# Patient Record
Sex: MALE | NOT HISPANIC OR LATINO | Employment: FULL TIME | URBAN - METROPOLITAN AREA
[De-identification: names, ages, dates, MRNs, and addresses within clinical notes are randomized per-mention and may not be internally consistent; named-entity substitution may affect disease eponyms.]

---

## 2024-02-06 ENCOUNTER — APPOINTMENT (OUTPATIENT)
Dept: RADIOLOGY | Facility: HOSPITAL | Age: 29
End: 2024-02-06
Payer: COMMERCIAL

## 2024-02-06 ENCOUNTER — APPOINTMENT (OUTPATIENT)
Dept: CARDIOLOGY | Facility: HOSPITAL | Age: 29
End: 2024-02-06
Payer: COMMERCIAL

## 2024-02-06 ENCOUNTER — HOSPITAL ENCOUNTER (EMERGENCY)
Facility: HOSPITAL | Age: 29
Discharge: HOME | End: 2024-02-06
Attending: STUDENT IN AN ORGANIZED HEALTH CARE EDUCATION/TRAINING PROGRAM
Payer: COMMERCIAL

## 2024-02-06 VITALS
TEMPERATURE: 97.7 F | RESPIRATION RATE: 10 BRPM | HEIGHT: 69 IN | SYSTOLIC BLOOD PRESSURE: 122 MMHG | WEIGHT: 136.69 LBS | HEART RATE: 52 BPM | BODY MASS INDEX: 20.24 KG/M2 | DIASTOLIC BLOOD PRESSURE: 62 MMHG | OXYGEN SATURATION: 97 %

## 2024-02-06 DIAGNOSIS — K27.9 PEPTIC ULCER DISEASE: Primary | ICD-10-CM

## 2024-02-06 LAB
ALBUMIN SERPL BCP-MCNC: 4.4 G/DL (ref 3.4–5)
ALP SERPL-CCNC: 55 U/L (ref 33–120)
ALT SERPL W P-5'-P-CCNC: 16 U/L (ref 10–52)
ANION GAP SERPL CALC-SCNC: 11 MMOL/L (ref 10–20)
AST SERPL W P-5'-P-CCNC: 11 U/L (ref 9–39)
BASOPHILS # BLD AUTO: 0.06 X10*3/UL (ref 0–0.1)
BASOPHILS NFR BLD AUTO: 1 %
BILIRUB SERPL-MCNC: 1.1 MG/DL (ref 0–1.2)
BUN SERPL-MCNC: 13 MG/DL (ref 6–23)
CALCIUM SERPL-MCNC: 8.9 MG/DL (ref 8.6–10.3)
CARDIAC TROPONIN I PNL SERPL HS: 3 NG/L (ref 0–20)
CARDIAC TROPONIN I PNL SERPL HS: <3 NG/L (ref 0–20)
CHLORIDE SERPL-SCNC: 103 MMOL/L (ref 98–107)
CO2 SERPL-SCNC: 27 MMOL/L (ref 21–32)
CREAT SERPL-MCNC: 0.87 MG/DL (ref 0.5–1.3)
D DIMER PPP FEU-MCNC: <215 NG/ML FEU
EGFRCR SERPLBLD CKD-EPI 2021: >90 ML/MIN/1.73M*2
EOSINOPHIL # BLD AUTO: 0.17 X10*3/UL (ref 0–0.7)
EOSINOPHIL NFR BLD AUTO: 2.9 %
ERYTHROCYTE [DISTWIDTH] IN BLOOD BY AUTOMATED COUNT: 11.5 % (ref 11.5–14.5)
GLUCOSE SERPL-MCNC: 100 MG/DL (ref 74–99)
HCT VFR BLD AUTO: 43.5 % (ref 41–52)
HGB BLD-MCNC: 15 G/DL (ref 13.5–17.5)
IMM GRANULOCYTES # BLD AUTO: 0.03 X10*3/UL (ref 0–0.7)
IMM GRANULOCYTES NFR BLD AUTO: 0.5 % (ref 0–0.9)
LIPASE SERPL-CCNC: 22 U/L (ref 9–82)
LYMPHOCYTES # BLD AUTO: 1.47 X10*3/UL (ref 1.2–4.8)
LYMPHOCYTES NFR BLD AUTO: 25.4 %
MAGNESIUM SERPL-MCNC: 1.88 MG/DL (ref 1.6–2.4)
MCH RBC QN AUTO: 30.8 PG (ref 26–34)
MCHC RBC AUTO-ENTMCNC: 34.5 G/DL (ref 32–36)
MCV RBC AUTO: 89 FL (ref 80–100)
MONOCYTES # BLD AUTO: 0.39 X10*3/UL (ref 0.1–1)
MONOCYTES NFR BLD AUTO: 6.7 %
NEUTROPHILS # BLD AUTO: 3.66 X10*3/UL (ref 1.2–7.7)
NEUTROPHILS NFR BLD AUTO: 63.5 %
NRBC BLD-RTO: 0 /100 WBCS (ref 0–0)
PLATELET # BLD AUTO: 249 X10*3/UL (ref 150–450)
POTASSIUM SERPL-SCNC: 4.1 MMOL/L (ref 3.5–5.3)
PROT SERPL-MCNC: 6.8 G/DL (ref 6.4–8.2)
RBC # BLD AUTO: 4.87 X10*6/UL (ref 4.5–5.9)
SODIUM SERPL-SCNC: 137 MMOL/L (ref 136–145)
WBC # BLD AUTO: 5.8 X10*3/UL (ref 4.4–11.3)

## 2024-02-06 PROCEDURE — 2500000001 HC RX 250 WO HCPCS SELF ADMINISTERED DRUGS (ALT 637 FOR MEDICARE OP): Performed by: PHYSICIAN ASSISTANT

## 2024-02-06 PROCEDURE — 36415 COLL VENOUS BLD VENIPUNCTURE: CPT | Performed by: PHYSICIAN ASSISTANT

## 2024-02-06 PROCEDURE — 93005 ELECTROCARDIOGRAM TRACING: CPT

## 2024-02-06 PROCEDURE — 71046 X-RAY EXAM CHEST 2 VIEWS: CPT | Performed by: RADIOLOGY

## 2024-02-06 PROCEDURE — 83690 ASSAY OF LIPASE: CPT | Performed by: PHYSICIAN ASSISTANT

## 2024-02-06 PROCEDURE — 85379 FIBRIN DEGRADATION QUANT: CPT | Performed by: PHYSICIAN ASSISTANT

## 2024-02-06 PROCEDURE — 83735 ASSAY OF MAGNESIUM: CPT | Performed by: PHYSICIAN ASSISTANT

## 2024-02-06 PROCEDURE — 71046 X-RAY EXAM CHEST 2 VIEWS: CPT

## 2024-02-06 PROCEDURE — 84484 ASSAY OF TROPONIN QUANT: CPT | Performed by: PHYSICIAN ASSISTANT

## 2024-02-06 PROCEDURE — 85025 COMPLETE CBC W/AUTO DIFF WBC: CPT | Performed by: PHYSICIAN ASSISTANT

## 2024-02-06 PROCEDURE — 80053 COMPREHEN METABOLIC PANEL: CPT | Performed by: PHYSICIAN ASSISTANT

## 2024-02-06 PROCEDURE — 99284 EMERGENCY DEPT VISIT MOD MDM: CPT | Mod: 25 | Performed by: STUDENT IN AN ORGANIZED HEALTH CARE EDUCATION/TRAINING PROGRAM

## 2024-02-06 RX ORDER — LIDOCAINE HYDROCHLORIDE 20 MG/ML
1.25 SOLUTION OROPHARYNGEAL ONCE
Status: COMPLETED | OUTPATIENT
Start: 2024-02-06 | End: 2024-02-06

## 2024-02-06 RX ORDER — OMEPRAZOLE 20 MG/1
40 CAPSULE, DELAYED RELEASE ORAL DAILY
Qty: 60 CAPSULE | Refills: 0 | Status: SHIPPED | OUTPATIENT
Start: 2024-02-06 | End: 2024-05-31 | Stop reason: WASHOUT

## 2024-02-06 RX ORDER — ALUMINUM HYDROXIDE, MAGNESIUM HYDROXIDE, AND SIMETHICONE 1200; 120; 1200 MG/30ML; MG/30ML; MG/30ML
30 SUSPENSION ORAL ONCE
Status: COMPLETED | OUTPATIENT
Start: 2024-02-06 | End: 2024-02-06

## 2024-02-06 RX ADMIN — LIDOCAINE HYDROCHLORIDE 15 ML: 20 SOLUTION ORAL at 10:25

## 2024-02-06 RX ADMIN — ALUMINUM HYDROXIDE, MAGNESIUM HYDROXIDE, AND DIMETHICONE 30 ML: 200; 20; 200 SUSPENSION ORAL at 10:26

## 2024-02-06 ASSESSMENT — PAIN DESCRIPTION - FREQUENCY: FREQUENCY: CONSTANT/CONTINUOUS

## 2024-02-06 ASSESSMENT — COLUMBIA-SUICIDE SEVERITY RATING SCALE - C-SSRS
6. HAVE YOU EVER DONE ANYTHING, STARTED TO DO ANYTHING, OR PREPARED TO DO ANYTHING TO END YOUR LIFE?: NO
2. HAVE YOU ACTUALLY HAD ANY THOUGHTS OF KILLING YOURSELF?: NO
1. IN THE PAST MONTH, HAVE YOU WISHED YOU WERE DEAD OR WISHED YOU COULD GO TO SLEEP AND NOT WAKE UP?: NO

## 2024-02-06 ASSESSMENT — PAIN - FUNCTIONAL ASSESSMENT: PAIN_FUNCTIONAL_ASSESSMENT: 0-10

## 2024-02-06 ASSESSMENT — PAIN DESCRIPTION - DESCRIPTORS: DESCRIPTORS: ACHING

## 2024-02-06 ASSESSMENT — LIFESTYLE VARIABLES
EVER HAD A DRINK FIRST THING IN THE MORNING TO STEADY YOUR NERVES TO GET RID OF A HANGOVER: NO
HAVE YOU EVER FELT YOU SHOULD CUT DOWN ON YOUR DRINKING: NO
EVER FELT BAD OR GUILTY ABOUT YOUR DRINKING: NO
HAVE PEOPLE ANNOYED YOU BY CRITICIZING YOUR DRINKING: NO

## 2024-02-06 ASSESSMENT — PAIN SCALES - GENERAL
PAINLEVEL_OUTOF10: 2
PAINLEVEL_OUTOF10: 4

## 2024-02-06 ASSESSMENT — PAIN DESCRIPTION - PROGRESSION: CLINICAL_PROGRESSION: NOT CHANGED

## 2024-02-06 ASSESSMENT — PAIN DESCRIPTION - LOCATION: LOCATION: CHEST

## 2024-02-06 ASSESSMENT — PAIN DESCRIPTION - DIRECTION: RADIATING_TOWARDS: ABD

## 2024-02-06 ASSESSMENT — PAIN DESCRIPTION - ONSET: ONSET: AWAKENED FROM SLEEP

## 2024-02-06 NOTE — ED TRIAGE NOTES
Pt c/o epigastric pain that radiates to his abdomin for the past week off and on. The pain increases with fatty foods.

## 2024-02-06 NOTE — ED PROVIDER NOTES
HPI   Chief Complaint   Patient presents with    Chest Pain     Pt c/o epigastric pain that radiates to his abdomin for the past week off and on. The pain increases with fatty foods.       History of present illness:  28-year-old male presents to the emergency room for complaints of epigastric abdominal pain that began 8 days ago.  He states the pain is in his upper abdomen and states that moves to the right upper side slightly.  He states he has not had any nausea or vomiting and states that she gets better when he eats.  He states he been taking Prilosec as well for the past 5 days which has been helping some.  He denies any change in bowel habits or any blood in his stool.  He states he has no previous gastric pathology.  He states that he does have a history of a bicuspid valve of the aortic valve but he states he has not had an echo done in over 10 years.  He states he has no other symptoms at this time he states he does drink alcohol on daily basis usually a couple beers at least a day.  He denies taking any NSAIDs or aspirin denies any other use of over-the-counter medications in general.    Social history: Negative for drug use, confirms regular alcohol use    Review of systems:   Gen.: No weight loss,  fever.   Eyes: No vision loss, double vision  ENT: No pharyngitis, neck pain  Cardiac: No chest pain, palpitations, syncope  Pulmonary: No shortness of breath, cough, hemoptysis.   Heme/lymph: No swollen glands, fever, bleeding.   GI: No change in bowel habits, melena, hematemesis, hematochezia, vomiting, diarrhea.   : No discharge, dysuria, frequency, urgency, hematuria.   Musculoskeletal: No limb pain, joint pain, joint swelling.   Skin: No rashes.   Review of systems is otherwise negative unless stated above or in history of present illness.          Physical exam:  General: Vitals noted, no distress. Afebrile.   EENT: No lymphadenopathy appreciated  Cardiac: Regular, rate, rhythm, no murmur.    Pulmonary: Lungs clear bilaterally with good aeration. No adventitious breath sounds.   Abdomen: Soft, nonsurgical. Nontender. No peritoneal signs. Normoactive bowel sounds.   Extremities: No peripheral edema.   Skin: No rash.   Neuro: No focal neurologic deficits        Medical decision making:   Testing: CBC CMP troponin EKG chest x-ray lipase: No acute findings troponin x 2 was negative EKG showed left ventricular hypertrophy but otherwise unremarkable  Treatment: Maalox p.o. and viscous lidocaine p.o. given  Reevaluation: Patient reports improvement in symptoms  Plan: Home-going.  Discussed differential. Will follow-up with GI and Cardiology in the next 2-3 days. Return if worse. They understand return precautions and discharge instructions. Patient and family/friend/caregiver are in agreement with this plan. 28-year-old male presents to the emergency room for complaints of epigastric abdominal pain that began 8 days ago.  He states the pain is in his upper abdomen and states that moves to the right upper side slightly.  He states he has not had any nausea or vomiting and states that she gets better when he eats.  He states he been taking Prilosec as well for the past 5 days which has been helping some.  He denies any change in bowel habits or any blood in his stool.  He states he has no previous gastric pathology.  He states that he does have a history of a bicuspid valve of the aortic valve but he states he has not had an echo done in over 10 years.  He states he has no other symptoms at this time he states he does drink alcohol on daily basis usually a couple beers at least a day.  He denies taking any NSAIDs or aspirin denies any other use of over-the-counter medications in general.  On physical exam there is no pain to palpation across the abdomen normal active bowel sounds throughout lungs are clear to auscultation bilaterally normal S1-S2 heart sounds.  I explained to the patient that I believe that he  would benefit from a short course of Prilosec 40 mg a day.  I explained to him that like him to follow-up closely with cardiology as well as gastroenterology given his symptoms and he was agreeable to this plan.  Impression:   1.  Peptic ulcer disease            History provided by:  Patient   used: No                        Isaiah Coma Scale Score: 15                  Patient History   No past medical history on file.  Past Surgical History:   Procedure Laterality Date    OTHER SURGICAL HISTORY  03/30/2017    Complex Repair Of Wound Fingers    OTHER SURGICAL HISTORY  03/30/2017    Oral Surgery Tooth Extraction Ronceverte Tooth     No family history on file.  Social History     Tobacco Use    Smoking status: Not on file    Smokeless tobacco: Not on file   Substance Use Topics    Alcohol use: Not on file    Drug use: Not on file       Physical Exam   ED Triage Vitals [02/06/24 0903]   Temperature Heart Rate Respirations BP   36.5 °C (97.7 °F) 75 16 140/90      Pulse Ox Temp src Heart Rate Source Patient Position   100 % -- -- --      BP Location FiO2 (%)     Right arm --       Physical Exam    ED Course & MDM   ED Course as of 02/06/24 1112   Tue Feb 06, 2024   0926 EKG taken at 857 on February 6, 2024 shows left ventricular hypertrophy normal sinus rhythm at 68, no STEMI, normal axis [JF]      ED Course User Index  [JF] Brad Richter PA-C         Diagnoses as of 02/06/24 1112   Peptic ulcer disease       Medical Decision Making      Procedure  Procedures     Brad Richter PA-C  02/06/24 1114       Brad Richter PA-C  02/06/24 1114

## 2024-02-08 ENCOUNTER — APPOINTMENT (OUTPATIENT)
Dept: GASTROENTEROLOGY | Facility: CLINIC | Age: 29
End: 2024-02-08
Payer: COMMERCIAL

## 2024-03-08 ENCOUNTER — OFFICE VISIT (OUTPATIENT)
Dept: CARDIOLOGY | Facility: HOSPITAL | Age: 29
End: 2024-03-08
Payer: COMMERCIAL

## 2024-03-08 VITALS
BODY MASS INDEX: 20.02 KG/M2 | DIASTOLIC BLOOD PRESSURE: 71 MMHG | SYSTOLIC BLOOD PRESSURE: 120 MMHG | HEART RATE: 83 BPM | OXYGEN SATURATION: 97 % | WEIGHT: 135.58 LBS

## 2024-03-08 DIAGNOSIS — Q23.1 BICUSPID AORTIC VALVE (HHS-HCC): Primary | ICD-10-CM

## 2024-03-08 PROCEDURE — 99214 OFFICE O/P EST MOD 30 MIN: CPT | Performed by: INTERNAL MEDICINE

## 2024-03-08 PROCEDURE — 1036F TOBACCO NON-USER: CPT | Performed by: INTERNAL MEDICINE

## 2024-03-08 NOTE — PROGRESS NOTES
Primary Care Physician: Emely Christian MD   Date of Visit: 03/08/2024  2:40 PM EST  Type of Visit: New patient visit      Chief Complaint:  Abnormal EKG    HPI  Minor Leigh 28 y.o. male who presents to [facility].    He was seen in the emergency department for noncardiac chest pain.  Further workup at an EKG that suggested LVH and was subsequently referred to me.  He denies any angina, shortness of breath, paroxysmal nocturnal dyspnea, orthopnea, peripheral edema, near-syncope, syncope, palpitations.    He reports that as a child was diagnosed with bicuspid aortic valve and was having annual ultrasounds.    Review of Systems   12 point review of systems was obtained in detail and is negative other than that noted above or below.     Past Medical/Surgical History:  Minor has no past medical history on file.    Minor has a past surgical history that includes Other surgical history (03/30/2017) and Other surgical history (03/30/2017).    Social/Family History:  Minor reports that he has never smoked. He has quit using smokeless tobacco. He reports current alcohol use of about 3.0 standard drinks of alcohol per week. He reports that he does not use drugs.    Minor's family history is not on file.    Allergies:  No Known Allergies    Medications:  Current Outpatient Medications on File Prior to Visit   Medication Sig    omeprazole (PriLOSEC) 20 mg DR capsule Take 2 capsules (40 mg) by mouth once daily. Do not crush or chew.     No current facility-administered medications on file prior to visit.       Objective:   Vitals:    03/08/24 1442   BP: 120/71   Pulse: 83   SpO2: 97%       S1-S2 normal, regular rate and rhythm, systolic ejection murmur, no JVD  Clear to auscultation bilaterally    Labs and Imaging:      Latest Ref Rng & Units 2/6/2024     9:19 AM   Electrolytes   Na 136 - 145 mmol/L 137    K 3.5 - 5.3 mmol/L 4.1    Cl 98 - 107 mmol/L 103    CO2 21 - 32 mmol/L 27    Cr 0.50 - 1.30 mg/dL 0.87    Ca 8.6 - 10.3  "mg/dL 8.9          Latest Ref Rng & Units 2/6/2024     9:19 AM   CBC   Hb 13.5 - 17.5 g/dL 15.0    Hct 41.0 - 52.0 % 43.5    MCV 80 - 100 fL 89    RDW 11.5 - 14.5 % 11.5          Latest Ref Rng & Units 2/6/2024     9:19 AM   Lipids   ALT 10 - 52 U/L 16    AST 9 - 39 U/L 11           No data to display                   No data to display                 No results found for: \"HGBA1C\", \"ALBUR\"     The ASCVD Risk score (Jesus LEE, et al., 2019) failed to calculate for the following reasons:    The 2019 ASCVD risk score is only valid for ages 40 to 79     Echocardiogram: No results found for this or any previous visit.     Echocardiogram: No results found for this or any previous visit from the past 1825 days.    Stress Testing: No results found for this or any previous visit from the past 1825 days.    Cardiac Catheterization: No results found for this or any previous visit from the past 1825 days.    Cardiac Scoring: No results found for this or any previous visit from the past 1825 days.    AAA : No results found for this or any previous visit from the past 1825 days.    OTHER: No results found for this or any previous visit from the past 1825 days.        Assessment/Plan:   1. Bicuspid aortic valve  Transthoracic Echo (TTE) Complete           Minor Leigh 28 y.o. male who presents to establish care:    1.  Bicuspid aortic valve  2.  Left ventricular hypertrophy on EKG    Will check an echocardiogram to define whether he has a bicuspid aortic valve and whether he has LVH.  Follow-up after testing.      Time Spent: I spent 30 minutes reviewing medical testing, obtaining medical history and counselling and educating on diagnosis and documenting clinical encounter.         ____________________________________________________________  Cayden Hilliard MD    "

## 2024-03-26 ENCOUNTER — HOSPITAL ENCOUNTER (OUTPATIENT)
Dept: CARDIOLOGY | Facility: HOSPITAL | Age: 29
Discharge: HOME | End: 2024-03-26
Payer: COMMERCIAL

## 2024-03-26 DIAGNOSIS — Q23.1 BICUSPID AORTIC VALVE (HHS-HCC): ICD-10-CM

## 2024-03-26 PROCEDURE — 93306 TTE W/DOPPLER COMPLETE: CPT

## 2024-03-26 PROCEDURE — 93306 TTE W/DOPPLER COMPLETE: CPT | Performed by: INTERNAL MEDICINE

## 2024-04-01 LAB
AORTIC VALVE MEAN GRADIENT: 8 MMHG
AORTIC VALVE PEAK VELOCITY: 2.06 M/S
AV PEAK GRADIENT: 16.9 MMHG
AVA (PEAK VEL): 2.04 CM2
AVA (VTI): 2.2 CM2
EJECTION FRACTION APICAL 4 CHAMBER: 59
LEFT ATRIUM VOLUME AREA LENGTH INDEX BSA: 18.2 ML/M2
LEFT VENTRICLE INTERNAL DIMENSION DIASTOLE: 5.25 CM (ref 3.5–6)
LEFT VENTRICULAR OUTFLOW TRACT DIAMETER: 2.34 CM
LV EJECTION FRACTION BIPLANE: 61 %
MITRAL VALVE E/A RATIO: 2.81
RIGHT VENTRICLE FREE WALL PEAK S': 0.17 CM/S
RIGHT VENTRICLE PEAK SYSTOLIC PRESSURE: 18.9 MMHG

## 2024-04-05 ENCOUNTER — OFFICE VISIT (OUTPATIENT)
Dept: CARDIOLOGY | Facility: HOSPITAL | Age: 29
End: 2024-04-05
Payer: COMMERCIAL

## 2024-04-05 VITALS
BODY MASS INDEX: 20.58 KG/M2 | HEART RATE: 77 BPM | SYSTOLIC BLOOD PRESSURE: 113 MMHG | OXYGEN SATURATION: 99 % | WEIGHT: 139.33 LBS | DIASTOLIC BLOOD PRESSURE: 75 MMHG

## 2024-04-05 DIAGNOSIS — Q23.1 BICUSPID AORTIC VALVE (HHS-HCC): Primary | ICD-10-CM

## 2024-04-05 PROCEDURE — 99213 OFFICE O/P EST LOW 20 MIN: CPT | Performed by: INTERNAL MEDICINE

## 2024-04-05 PROCEDURE — 1036F TOBACCO NON-USER: CPT | Performed by: INTERNAL MEDICINE

## 2024-04-05 NOTE — PROGRESS NOTES
Primary Care Physician: Emely Christian MD   Date of Visit: 04/05/2024  8:00 AM EDT  Type of Visit: New patient visit      Chief Complaint:  Abnormal EKG    HPI  Minor Leigh 28 y.o. male who presents to follow up.    Denies any angina, shortness of breath, paroxysmal nocturnal dyspnea, orthopnea, peripheral edema, near-syncope, syncope, or palpitations.    He reports that as a child was diagnosed with bicuspid aortic valve and was having annual ultrasounds.    Review of Systems   12 point review of systems was obtained in detail and is negative other than that noted above or below.     Past Medical/Surgical History:  Minor has no past medical history on file.    Minor has a past surgical history that includes Other surgical history (03/30/2017) and Other surgical history (03/30/2017).    Social/Family History:  Minor reports that he has never smoked. He has quit using smokeless tobacco. He reports current alcohol use of about 3.0 standard drinks of alcohol per week. He reports that he does not use drugs.    Minor's family history is not on file.    Allergies:  No Known Allergies    Medications:  Current Outpatient Medications on File Prior to Visit   Medication Sig    omeprazole (PriLOSEC) 20 mg DR capsule Take 2 capsules (40 mg) by mouth once daily. Do not crush or chew.     No current facility-administered medications on file prior to visit.       Objective:   Vitals:    04/05/24 0755   BP: 113/75   Pulse: 77   SpO2: 99%       S1-S2 normal, regular rate and rhythm, systolic ejection murmur, no JVD  Clear to auscultation bilaterally    Labs and Imaging:      Latest Ref Rng & Units 2/6/2024     9:19 AM   Electrolytes   Na 136 - 145 mmol/L 137    K 3.5 - 5.3 mmol/L 4.1    Cl 98 - 107 mmol/L 103    CO2 21 - 32 mmol/L 27    Cr 0.50 - 1.30 mg/dL 0.87    Ca 8.6 - 10.3 mg/dL 8.9          Latest Ref Rng & Units 2/6/2024     9:19 AM   CBC   Hb 13.5 - 17.5 g/dL 15.0    Hct 41.0 - 52.0 % 43.5    MCV 80 - 100 fL 89    RDW 11.5 -  "14.5 % 11.5          Latest Ref Rng & Units 2/6/2024     9:19 AM   Lipids   ALT 10 - 52 U/L 16    AST 9 - 39 U/L 11           No data to display                   No data to display                 No results found for: \"HGBA1C\", \"ALBUR\"     The ASCVD Risk score (Jesus LEE, et al., 2019) failed to calculate for the following reasons:    The 2019 ASCVD risk score is only valid for ages 40 to 79     Echocardiogram: No results found for this or any previous visit.     Echocardiogram: No results found for this or any previous visit from the past 1825 days.    Stress Testing: No results found for this or any previous visit from the past 1825 days.    Cardiac Catheterization: No results found for this or any previous visit from the past 1825 days.    Cardiac Scoring: No results found for this or any previous visit from the past 1825 days.    AAA : No results found for this or any previous visit from the past 1825 days.    OTHER: No results found for this or any previous visit from the past 1825 days.        Assessment/Plan:   No diagnosis found.       Minor Leigh 28 y.o. male who presents to establish care:    1.  Bicuspid aortic valve  2.  Left ventricular hypertrophy on EKG    Reviewed echocardiogram; no evidence of LVH but does have a bicuspid aortic valve. Aortic root normal.     Risk factor modification. RTC in 1 year.       Time Spent: I spent 30 minutes reviewing medical testing, obtaining medical history and counselling and educating on diagnosis and documenting clinical encounter.         ____________________________________________________________  Cayden Hilliard MD  "

## 2024-05-28 PROBLEM — R01.1 HEART MURMUR: Status: ACTIVE | Noted: 2024-05-28

## 2024-05-28 PROBLEM — I35.9 AORTIC VALVE DISORDER: Status: ACTIVE | Noted: 2024-05-28

## 2024-05-28 PROBLEM — Q23.81 BICUSPID AORTIC VALVE: Status: ACTIVE | Noted: 2024-05-28

## 2024-05-28 PROBLEM — R00.2 HEART PALPITATIONS: Status: ACTIVE | Noted: 2024-05-28

## 2024-05-28 PROBLEM — M54.2 NECK PAIN: Status: ACTIVE | Noted: 2024-05-28

## 2024-05-28 PROBLEM — Q23.1 BICUSPID AORTIC VALVE (HHS-HCC): Status: ACTIVE | Noted: 2024-05-28

## 2024-05-28 PROBLEM — K27.9 PEPTIC ULCER: Status: ACTIVE | Noted: 2024-05-28

## 2024-05-31 ENCOUNTER — OFFICE VISIT (OUTPATIENT)
Dept: PRIMARY CARE | Facility: CLINIC | Age: 29
End: 2024-05-31
Payer: COMMERCIAL

## 2024-05-31 VITALS
WEIGHT: 138 LBS | HEIGHT: 68 IN | OXYGEN SATURATION: 98 % | DIASTOLIC BLOOD PRESSURE: 72 MMHG | SYSTOLIC BLOOD PRESSURE: 120 MMHG | BODY MASS INDEX: 20.92 KG/M2 | HEART RATE: 66 BPM

## 2024-05-31 DIAGNOSIS — Z00.00 WELL ADULT HEALTH CHECK: Primary | ICD-10-CM

## 2024-05-31 PROCEDURE — 90715 TDAP VACCINE 7 YRS/> IM: CPT | Performed by: FAMILY MEDICINE

## 2024-05-31 PROCEDURE — 1036F TOBACCO NON-USER: CPT | Performed by: FAMILY MEDICINE

## 2024-05-31 PROCEDURE — 99385 PREV VISIT NEW AGE 18-39: CPT | Performed by: FAMILY MEDICINE

## 2024-05-31 PROCEDURE — 90471 IMMUNIZATION ADMIN: CPT | Performed by: FAMILY MEDICINE

## 2024-05-31 ASSESSMENT — PATIENT HEALTH QUESTIONNAIRE - PHQ9
1. LITTLE INTEREST OR PLEASURE IN DOING THINGS: NOT AT ALL
SUM OF ALL RESPONSES TO PHQ9 QUESTIONS 1 AND 2: 0
2. FEELING DOWN, DEPRESSED OR HOPELESS: NOT AT ALL

## 2024-05-31 ASSESSMENT — ENCOUNTER SYMPTOMS
OCCASIONAL FEELINGS OF UNSTEADINESS: 0
LOSS OF SENSATION IN FEET: 0
SORE THROAT: 1
DEPRESSION: 0

## 2024-05-31 NOTE — PATIENT INSTRUCTIONS
Please return for your next Wellness visit in 12 months. Please return or seek medical attention if symptoms persist, change, worsen, or return. For emergencies, call 9-1-1 or go to the nearest Emergency Room.    Recommended vaccines:  Influenza, annual  TDaP (tetanus-diphtheria-pertussis) vaccine  Avoid taking Biotin (a vitamin, shows up particularly in hair/nail supplements) for a week prior to any blood tests, as it can interfere with certain results. Fasting for labs means 12 hours, nothing to eat or drink, except water and medications, unless directed otherwise.    For assistance with scheduling referrals or consultations, please call [phone]. For laboratory, radiology, and other tests, please call [phone] ([phone] for pediatrics). Please review prescription inserts and published information for possible adverse effects of all medications. Return after testing or consultation to review results and recommendations, if symptoms persist, change, worsen, or return, or if you have any question or concern. If you do not get results within 7-10 days, or you have any question or concern, please send a message, call the office ([phone]), or return to the office for a follow-up appointment. For non-emergencies, you may call the office. For emergencies, call 9-1-1 or go to the nearest Emergency Department. Please schedule additional appointment(s) to address concern(s) not addressed today.    In general, results are not released or discussed over the telephone, but at an appointment or via  [employer]. Results of tests done through [facility] are released via  [employer] (see below).  [email or web address]   [employer] support line: [phone]

## 2024-05-31 NOTE — ASSESSMENT & PLAN NOTE
28yM doing fairly well.     Try PPI x2-4 weeks, wear a respirator at work, to see if it helps with the sore throat. Return within 2-3 months, if symptoms persist, worsen, or return.

## 2024-05-31 NOTE — PROGRESS NOTES
"Subjective   Patient ID: Minor Leigh is a 28 y.o. male who presents for Establish Care (Pt presents as a new pt to establish care, wellness exam, pt c/o sore throat x1 month, better since 3 day weekend, works in a high dust area with massive ventilation, no rx's.BL).  HPI Historian(s): Self    c/o sore throat for a while, but now \"pretty much gone.\" Started around the time of having a stomach ulcer. Would only be noticeable if doing something to irritate it, e.g., smoking weed. Got better after being off of work for 3 days, and has overall been getting better gradually. Went to urgent care for this, reports nothing found. Symptoms get worse when at work.    Never took the Omeprazole. Changed his diet, improved.    Review of Systems   HENT:  Positive for sore throat.    All other systems reviewed and are negative.  Heart murmur  Ulcer  Stomach/intestinal problems    Objective   /72   Pulse 66   Ht 1.734 m (5' 8.25\")   Wt 62.6 kg (138 lb)   SpO2 98%   BMI 20.83 kg/m²     Physical Exam  Vitals and nursing note reviewed.   Constitutional:       General: He is not in acute distress.     Appearance: Normal appearance. He is not diaphoretic.      Comments: No assistive device presently being used.   HENT:      Head: Normocephalic and atraumatic.   Eyes:      General: No scleral icterus.     Extraocular Movements: Extraocular movements intact.      Conjunctiva/sclera: Conjunctivae normal.   Cardiovascular:      Rate and Rhythm: Normal rate and regular rhythm.      Heart sounds: Normal heart sounds.   Pulmonary:      Effort: Pulmonary effort is normal. No respiratory distress.      Breath sounds: Normal breath sounds.   Abdominal:      General: Bowel sounds are normal. There is no distension.      Palpations: Abdomen is soft. There is no mass.      Tenderness: There is no abdominal tenderness. There is no guarding or rebound.   Musculoskeletal:      Right lower leg: No edema.      Left lower leg: No edema. "   Skin:     General: Skin is warm and dry.      Coloration: Skin is not jaundiced.   Neurological:      General: No focal deficit present.      Mental Status: He is alert and oriented to person, place, and time. Mental status is at baseline.   Psychiatric:         Mood and Affect: Mood normal.         Behavior: Behavior normal.         Thought Content: Thought content normal.         Assessment/Plan   Problem List Items Addressed This Visit       Well adult health check - Primary    Current Assessment & Plan     28yM doing fairly well.     Try PPI x2-4 weeks, wear a respirator at work, to see if it helps with the sore throat. Return within 2-3 months, if symptoms persist, worsen, or return.         Relevant Orders    Lipid Panel    Hemoglobin A1C

## 2024-09-17 ENCOUNTER — APPOINTMENT (OUTPATIENT)
Dept: PRIMARY CARE | Facility: CLINIC | Age: 29
End: 2024-09-17
Payer: COMMERCIAL

## 2025-04-04 ENCOUNTER — ANCILLARY PROCEDURE (OUTPATIENT)
Dept: CARDIOLOGY | Facility: HOSPITAL | Age: 30
End: 2025-04-04
Payer: COMMERCIAL

## 2025-04-04 ENCOUNTER — OFFICE VISIT (OUTPATIENT)
Dept: CARDIOLOGY | Facility: HOSPITAL | Age: 30
End: 2025-04-04
Payer: COMMERCIAL

## 2025-04-04 VITALS
SYSTOLIC BLOOD PRESSURE: 132 MMHG | DIASTOLIC BLOOD PRESSURE: 80 MMHG | WEIGHT: 141.31 LBS | OXYGEN SATURATION: 97 % | BODY MASS INDEX: 21.33 KG/M2 | HEART RATE: 81 BPM

## 2025-04-04 DIAGNOSIS — Q23.81 BICUSPID AORTIC VALVE: Primary | ICD-10-CM

## 2025-04-04 LAB
ATRIAL RATE: 60 BPM
P AXIS: 67 DEGREES
P OFFSET: 181 MS
P ONSET: 138 MS
PR INTERVAL: 164 MS
Q ONSET: 220 MS
QRS COUNT: 10 BEATS
QRS DURATION: 86 MS
QT INTERVAL: 372 MS
QTC CALCULATION(BAZETT): 372 MS
QTC FREDERICIA: 372 MS
R AXIS: 64 DEGREES
T AXIS: 42 DEGREES
T OFFSET: 406 MS
VENTRICULAR RATE: 60 BPM

## 2025-04-04 PROCEDURE — 99214 OFFICE O/P EST MOD 30 MIN: CPT | Performed by: PHYSICIAN ASSISTANT

## 2025-04-04 PROCEDURE — 93005 ELECTROCARDIOGRAM TRACING: CPT

## 2025-04-04 PROCEDURE — 1036F TOBACCO NON-USER: CPT | Performed by: PHYSICIAN ASSISTANT

## 2025-04-04 NOTE — PROGRESS NOTES
Chief Complaint:   No chief complaint on file.     History Of Present Illness:    Minor Leigh is a 29 y.o. male presenting with for follow-up.  Doing well.  Stays active works out multiple times a week. Denies any chest pain, chest pressure, palpitations, dizziness, cough, shortness of breath, orthopnea, edema.  No lightheadedness, dizziness, syncope or near syncope.      Last Recorded Vitals:  Vitals:    04/04/25 1452   BP: 132/80   Pulse: 81   SpO2: 97%   Weight: 64.1 kg (141 lb 5 oz)       Past Medical History:  He has a past medical history of Heart disease.    Past Surgical History:  He has a past surgical history that includes Other surgical history (03/30/2017) and Other surgical history (03/30/2017).      Social History:  He reports that he has quit smoking. His smoking use included cigarettes. He started smoking about 16 years ago. He has a 10 pack-year smoking history. He has quit using smokeless tobacco. He reports current alcohol use. He reports current drug use. Drug: Marijuana.    Family History:  No family history on file.     Allergies:  Patient has no known allergies.    Outpatient Medications:  No current outpatient medications    Physical Exam:  Physical Exam  Constitutional:       General: He is not in acute distress.  HENT:      Head: Normocephalic.      Nose: Nose normal.      Mouth/Throat:      Mouth: Mucous membranes are moist.   Eyes:      Conjunctiva/sclera: Conjunctivae normal.   Cardiovascular:      Rate and Rhythm: Normal rate and regular rhythm.      Pulses: Normal pulses.      Heart sounds: Normal heart sounds. No murmur heard.  Pulmonary:      Effort: Pulmonary effort is normal.   Abdominal:      General: Abdomen is flat.   Musculoskeletal:         General: Normal range of motion.      Cervical back: Normal range of motion.   Skin:     General: Skin is warm and dry.   Neurological:      General: No focal deficit present.      Mental Status: He is alert. Mental status is at baseline.  "  Psychiatric:         Mood and Affect: Mood normal.         Behavior: Behavior normal.       Last Labs:  CBC -  Lab Results   Component Value Date    WBC 5.8 02/06/2024    HGB 15.0 02/06/2024    HCT 43.5 02/06/2024    MCV 89 02/06/2024     02/06/2024       CMP -  Lab Results   Component Value Date    CALCIUM 8.9 02/06/2024    PROT 6.8 02/06/2024    ALBUMIN 4.4 02/06/2024    AST 11 02/06/2024    ALT 16 02/06/2024    ALKPHOS 55 02/06/2024    BILITOT 1.1 02/06/2024       LIPID PANEL -   No results found for: \"CHOL\", \"TRIG\", \"HDL\", \"CHHDL\", \"LDLF\", \"VLDL\", \"NHDL\"    RENAL FUNCTION PANEL -   Lab Results   Component Value Date    GLUCOSE 100 (H) 02/06/2024     02/06/2024    K 4.1 02/06/2024     02/06/2024    CO2 27 02/06/2024    ANIONGAP 11 02/06/2024    BUN 13 02/06/2024    CREATININE 0.87 02/06/2024    CALCIUM 8.9 02/06/2024    ALBUMIN 4.4 02/06/2024        No results found for: \"BNP\", \"HGBA1C\"    Last Cardiology Tests:  ECG:  ECG 12 lead (Clinic Performed) 04/04/2025 (Preliminary) normal sinus rhythm, 60 bpm.  No acute ischemic changes.     Echo:  Transthoracic Echo (TTE) Complete 03/26/2024  CONCLUSIONS:   1. Left ventricular systolic function is normal.   2. Aortic valve appears abnormal.   3. The aortic valve appears bicuspid.       Ejection Fractions:  No results found for: \"EF\"    Cath:  No results found for this or any previous visit from the past 1095 days.    Stress Test:  No results found for this or any previous visit from the past 1095 days.    Cardiac Imaging:  No results found for this or any previous visit from the past 1095 days.      Lab review: I have Chemistry BMP   Lab Results   Component Value Date    GLUCOSE 100 (H) 02/06/2024    CALCIUM 8.9 02/06/2024    CO2 27 02/06/2024    CREATININE 0.87 02/06/2024   , CBC:  Lab Results   Component Value Date    WBC 5.8 02/06/2024    RBC 4.87 02/06/2024    HGB 15.0 02/06/2024    HCT 43.5 02/06/2024    MCV 89 02/06/2024    MCH 30.8 02/06/2024 " "   MCHC 34.5 02/06/2024    RDW 11.5 02/06/2024    NRBC 0.0 02/06/2024   , Coags: No results found for: \"PTT\", \"APTT\", \"FFN\", \"FIBRINOGEN\", \"INR\", \"ACTIVATEDCL\", and Lipids: No results found for: \"CHOL\", \"CHLPL\", \"HDL\", \"LDLCALC\", \"TRIG\"  Diagnostic review: I have personally reviewed the result(s) of the Echocardiogram .  EKG    Assessment/Plan   Problem List Items Addressed This Visit             ICD-10-CM    Bicuspid aortic valve - Primary Q23.81    Relevant Orders    ECG 12 lead (Clinic Performed) (Completed)     Minor Leigh is a 29-year-old male with no significant past medical history who presents for follow-up.    1.  Bicuspid aortic valve  2.  LVH on EKG    EKG again reviewed with LVH per criteria.  Doing well overall.  Return to care in 1 year.  Will plan for echo at 1 years.      Nellie Pulliam PA-C  "

## 2025-07-16 ENCOUNTER — OFFICE VISIT (OUTPATIENT)
Dept: URGENT CARE | Age: 30
End: 2025-07-16
Payer: COMMERCIAL

## 2025-07-16 VITALS
RESPIRATION RATE: 20 BRPM | HEART RATE: 78 BPM | SYSTOLIC BLOOD PRESSURE: 116 MMHG | OXYGEN SATURATION: 99 % | DIASTOLIC BLOOD PRESSURE: 70 MMHG | TEMPERATURE: 98 F

## 2025-07-16 DIAGNOSIS — L23.7 POISON IVY DERMATITIS: Primary | ICD-10-CM

## 2025-07-16 PROCEDURE — 99213 OFFICE O/P EST LOW 20 MIN: CPT | Performed by: PHYSICIAN ASSISTANT

## 2025-07-16 RX ORDER — PREDNISONE 10 MG/1
TABLET ORAL
Qty: 35 TABLET | Refills: 0 | Status: SHIPPED | OUTPATIENT
Start: 2025-07-16 | End: 2025-07-31

## 2025-07-16 NOTE — PROGRESS NOTES
Subjective   Patient ID: Minor Leigh is a 30 y.o. male. They present today with a chief complaint of Poison Ivy (Started on 7/4/25, rash in all over body /).    History of Present Illness    Poison Ivy    This is a 30-year-old male presents urgent care for rash.  Started on 7/5/2025 he was outside working.  He believes he came in contact with his nightly.  Located over his arms, left chest, right abdomen, and back.  Reports it is itchy.  He has been using anti-itch spray which does help.  Denies new soaps, medications, detergents, or foods.  No one else has a similar rash.  Denies swelling of lips, tongue, or throat.  Past Medical History  Allergies as of 07/16/2025    (No Known Allergies)       Prescriptions Prior to Admission[1]     Medical History[2]    Surgical History[3]     reports that he has quit smoking. His smoking use included cigarettes. He started smoking about 16 years ago. He has a 10 pack-year smoking history. He has quit using smokeless tobacco. He reports current alcohol use. He reports current drug use. Drug: Marijuana.    Review of Systems  Review of Systems   All other systems reviewed and are negative.                                 Objective    Vitals:    07/16/25 1453   BP: 116/70   BP Location: Left arm   Patient Position: Sitting   BP Cuff Size: Adult   Pulse: 78   Resp: 20   Temp: 36.7 °C (98 °F)   TempSrc: Oral   SpO2: 99%     No LMP for male patient.    Physical Exam  Physical Exam    General: Vitals noted, no distress. Afebrile.    EENT: Posterior oropharynx unremarkable.    Cardiac: Regular, rate, rhythm    Pulmonary: Lungs clear bilaterally with good aeration. No adventitious breath sounds.    Abdomen: Soft, nonsurgical. Nontender. No peritoneal signs. Normoactive bowel sounds.    Extremities: No peripheral edema. Neurovascularly intact throughout.    Skin: Erythematous based rash with vesicles located in clusters over forearm, left chest, right lower abdomen, and back.    Neuro:  No focal neurological deficits.  Procedures    Point of Care Test & Imaging Results from this visit  No results found for this visit on 07/16/25.   Imaging  No results found.    Cardiology, Vascular, and Other Imaging  No other imaging results found for the past 2 days      Diagnostic study results (if any) were reviewed by Dariel Longoria PA-C.    Assessment/Plan   Allergies, medications, history, and pertinent labs/EKGs/Imaging reviewed by Dariel Longoria PA-C.     Medical Decision Making  MDM:      Summary: This is a 30-year-old male here for rash. Vital signs were reviewed. Patient is well-appearing nontoxic on exam. Differential diagnosis includes but not limited to cellulitis, dermatitis, urticaria.  Patient does have dermatitis.  I will treat him with prednisone taper.  Advised antihistamine for pruritus.  Stable for discharge. Follow-up with PCP. Return to urgent care or go to the emergency department if symptoms worsen or if new symptoms develop.    Orders and Diagnoses  Diagnoses and all orders for this visit:  Poison ivy dermatitis  -     predniSONE (Deltasone) 10 mg tablet; Take 4 tablets (40 mg) by mouth once daily for 5 days, THEN 2 tablets (20 mg) once daily for 5 days, THEN 1 tablet (10 mg) once daily for 5 days.      Medical Admin Record      Patient disposition: Home    Electronically signed by Dariel Longoria PA-C  3:00 PM           [1] (Not in a hospital admission)  [2]   Past Medical History:  Diagnosis Date    Heart disease    [3]   Past Surgical History:  Procedure Laterality Date    OTHER SURGICAL HISTORY  03/30/2017    Complex Repair Of Wound Fingers    OTHER SURGICAL HISTORY  03/30/2017    Oral Surgery Tooth Extraction [facility] Tooth

## 2025-07-16 NOTE — PATIENT INSTRUCTIONS
"Poison ivy, poison oak, and poison sumac    The Basics  Written by the doctors and editors at [facility]  What are poison ivy, poison oak, and poison sumac?  These are plants that can cause an itchy skin rash. When people have this rash, they often say something like \"I got poison ivy.\"  The rash from these poisonous plants is very itchy and might form fluid-filled blisters. The blisters can show up in different places at different times. But that does not mean the rash is spreading. Touching the blisters or the fluid inside does not spread the rash. The rash should go away within 1 to 3 weeks.  The substance that causes the rash is found in other plant materials like the ginkgo fruit and honorio peels.  How do people get a rash from these plants?  You can get it if you:  ? Touch a poison ivy, poison oak, or poison sumac plant  ? Touch something that has the plant's oils on it (such as clothing, shoes, animal fur, or garden tools)  ? Are nearby when these plants are being burned  What do poison ivy, poison oak, and poison sumac look like?  Poison ivy and poison oak have 3 leaves coming off a single stem (figure 1). That's why there is a saying, \"leaves of 3, let them be.\" The leaves start out green, but they can turn red or brown. Even dead plants can cause the rash.  Poison sumac has 5, 7, or more leaves on each stem.  How can I relieve the itching?  You should do the following:  ? Avoid scratching (this makes the itch worse). It can also cause irritation and make the rash last longer.  ? To help with itching, you can:  ? Add oatmeal to a cool or lukewarm bath.  ? Apply cool compresses. Dip a clean cloth in cold water, and put it on the rash.  ? Put calamine lotion on the rash.  ? If your blisters have started to pop, use skin products that have aluminum acetate or \"Burow's solution\" in them (sample brand names: Dermeleve, Domeboro).  If you have a very bad rash, see your doctor or nurse. They might prescribe medicines " called steroids. These can reduce swelling and relieve itching. They come as creams, ointments, or pills. Your doctor or nurse will decide what form you should use.  Steroid creams and ointments are also sold without a prescription. But non-prescription versions are not usually strong enough to help with poison ivy.  Some products can cause a reaction that makes your skin more itchy or irritated. Do not use these to treat a rash from one of these plants. They include antihistamine creams or lotions, numbing products with benzocaine, and antibiotic ointments with neomycin or bacitracin.  How can I avoid getting poison ivy again?  ? Stay away from poison ivy, poison oak, and poison sumac, even if the plant is dead.  ? Wear long sleeves and pants when working near poisonous plants. Wash your clothes right away when you are done.  ? Wear thick vinyl gloves when doing yard work. (Latex and rubber gloves do not always protect against poisonous plants.)  ? If you do touch a poisonous plant, gently wash the area as soon as possible. Do not rub or scrub. It might help to use a damp washcloth with liquid dish soap under running hot water.  ? Do not burn poison ivy, poison oak, or poison sumac plants.  ? Bathe your pets if they touch poisonous plants. Wear gloves when you bathe them. Pets do not get a rash, but if they get the oil on them, people could get a rash from touching their fur.  When should I call the doctor?  Call your doctor or nurse if:  ? You have a fever of 100.4°F (38°C) or higher, or chills.  ? The rash spreads over large parts of your body, especially if it covers your face, is around your eyes, or is on your genitals.  ? You have a lot of swelling.  ? You are not sure if you have poison ivy.  ? You have signs of a skin infection like swelling, redness, warmth, pain, or pus draining from the area of the rash.  ? Your rash does not get better after 2 to 3 weeks.

## 2025-07-17 ENCOUNTER — TELEPHONE (OUTPATIENT)
Dept: URGENT CARE | Age: 30
End: 2025-07-17